# Patient Record
Sex: FEMALE
[De-identification: names, ages, dates, MRNs, and addresses within clinical notes are randomized per-mention and may not be internally consistent; named-entity substitution may affect disease eponyms.]

---

## 2020-10-22 PROBLEM — Z00.00 ENCOUNTER FOR PREVENTIVE HEALTH EXAMINATION: Status: ACTIVE | Noted: 2020-10-22

## 2020-10-23 ENCOUNTER — APPOINTMENT (OUTPATIENT)
Dept: NEUROLOGY | Facility: CLINIC | Age: 65
End: 2020-10-23
Payer: COMMERCIAL

## 2020-10-23 VITALS
DIASTOLIC BLOOD PRESSURE: 85 MMHG | HEART RATE: 67 BPM | SYSTOLIC BLOOD PRESSURE: 121 MMHG | HEIGHT: 64 IN | TEMPERATURE: 97.7 F | OXYGEN SATURATION: 98 % | BODY MASS INDEX: 26.46 KG/M2 | WEIGHT: 155 LBS

## 2020-10-23 DIAGNOSIS — Z78.9 OTHER SPECIFIED HEALTH STATUS: ICD-10-CM

## 2020-10-23 DIAGNOSIS — H53.8 OTHER VISUAL DISTURBANCES: ICD-10-CM

## 2020-10-23 PROCEDURE — 99072 ADDL SUPL MATRL&STAF TM PHE: CPT

## 2020-10-23 PROCEDURE — 99205 OFFICE O/P NEW HI 60 MIN: CPT

## 2020-10-23 RX ORDER — ATORVASTATIN CALCIUM 10 MG/1
10 TABLET, FILM COATED ORAL
Refills: 0 | Status: ACTIVE | COMMUNITY

## 2020-10-23 RX ORDER — EZETIMIBE 10 MG/1
10 TABLET ORAL
Refills: 0 | Status: ACTIVE | COMMUNITY

## 2020-10-23 RX ORDER — HYDROCHLOROTHIAZIDE 25 MG/1
25 TABLET ORAL
Refills: 0 | Status: ACTIVE | COMMUNITY

## 2020-10-23 RX ORDER — ASPIRIN ENTERIC COATED TABLETS 81 MG 81 MG/1
81 TABLET, DELAYED RELEASE ORAL DAILY
Refills: 0 | Status: ACTIVE | COMMUNITY
Start: 2020-10-23

## 2020-10-23 NOTE — HISTORY OF PRESENT ILLNESS
[FreeTextEntry1] : Charity is a 65 year old, right handed, female who presents today for evaluation of a TIA. \par \par On September 13th she lost all vision in her left eye, her vision became opaque accompanied by left sided headache behind the eye that she describes as a dry eye. It was raining that night and while she as driving she began having a hard time seeing driving and she thought maybe it was dry eye. Another example was when she closed her right eye and she was looking at the TV screen it was white but she could see the TV frames and she had a hard time seeing other things. She went to sleep and the next morning her eye felt irritated/scratchy but her vision was totally normal. She went to her eye doctor in [place] and he said there is nothing wrong with your eye, I think it is a TIA. She went to the ED and she was there for three days and she was diagnosed with a TIA/very mild stroke. While in the ED the only finding was mild hypokalemia so that was replaced and she was sent home on a K+ supplement because she is on HCTZ. Once home she stopped the potassium as per her Cardiologist. \par \par MRI brain and MRA of head both unremarkable. US of carotids normal. TTE with bubble prior to discharge also unremarkable. \par \par Denies history of headaches or dizziness. \par \sima Is currently under a lot of stress from her . Since the event she cannot stop crying. \par \par History of high blood pressure and high cholesterol.

## 2020-10-23 NOTE — PHYSICAL EXAM
[FreeTextEntry1] : General:\par Constitutional:  Sitting comfortably in NAD.\par Psychiatric: well-groomed, appropriate affect, insight/judgment intact\par Ears, Nose, Throat: no abnormalities, mucus membranes moist\par Neck: supple\par Cardiovascular: regular rate and rhythm, normal S1/S2, no murmurs \par Chest: Clear to bases. 	Abdomen: soft, non-tender\par Extremities: no edema, clubbing or cyanosis\par Skin:  no rash or neurocutaneous signs \par \par Cognitive:\par Orientation, language, memory and knowledge screens intact.\par Cranial Nerves:\par II: Full to confrontation; disc margins sharp. III/IV/VI: PERRL EOMF No nystagmus\par V1V2V3: Symmetric, VII: Face appears symmetric VIII: Normal to screening, IX/X: Palate Elevates Symmetrical  XI: Trapezius Symmetric  XII: Tongue midline\par Motor:\par Power: 5/5 throughout, tone: normal x 4 limbs, no tremor \par Sensation:\par Intact to light touch. \par Coordination/Gait:\par Finger-nose-finger intact, normal rapid-alternating movements.\par Fine motor normal with normal rapid finger taps \par Narrow based gait, tandem forward and back ok\par Reflexes:\par DTR: 2+ symmetric x 4 limbs, finger flexors with BR and biceps;\par Plantar response: down x 2

## 2020-10-23 NOTE — DISCUSSION/SUMMARY
[FreeTextEntry1] : Impression: \par 65 year old female recently diagnosed with TIA for opaque/white film over the left eye. MRA, MRI, US of carotids, and TTE with bubble showed no acute findings. Placed on baby aspirin. W\par  \par History of high blood pressure and elevated cholesterol. History of lasix surgery. \par \par Family history of strokes, high blood pressure, and high cholesterol. \par \par Possible retinal migraine. \par \par Plan:\par 1) Stress management \par 2) Continue follow up with cardiologist and stress test \par 3) Charity will send over more records from her ED visit \par 4) Follow up in 4 months